# Patient Record
Sex: MALE | Race: WHITE | Employment: FULL TIME | URBAN - METROPOLITAN AREA
[De-identification: names, ages, dates, MRNs, and addresses within clinical notes are randomized per-mention and may not be internally consistent; named-entity substitution may affect disease eponyms.]

---

## 2023-07-20 ENCOUNTER — OFFICE VISIT (OUTPATIENT)
Dept: PRIMARY CARE | Facility: CLINIC | Age: 28
End: 2023-07-20
Payer: COMMERCIAL

## 2023-07-20 VITALS
BODY MASS INDEX: 26 KG/M2 | HEIGHT: 73 IN | WEIGHT: 196.2 LBS | SYSTOLIC BLOOD PRESSURE: 115 MMHG | OXYGEN SATURATION: 99 % | HEART RATE: 89 BPM | DIASTOLIC BLOOD PRESSURE: 76 MMHG | TEMPERATURE: 97.3 F

## 2023-07-20 DIAGNOSIS — M54.50 LEFT-SIDED LOW BACK PAIN WITHOUT SCIATICA, UNSPECIFIED CHRONICITY: Primary | ICD-10-CM

## 2023-07-20 DIAGNOSIS — G40.909 NONINTRACTABLE EPILEPSY WITHOUT STATUS EPILEPTICUS, UNSPECIFIED EPILEPSY TYPE (MULTI): ICD-10-CM

## 2023-07-20 PROCEDURE — 99213 OFFICE O/P EST LOW 20 MIN: CPT

## 2023-07-20 PROCEDURE — 1036F TOBACCO NON-USER: CPT

## 2023-07-20 ASSESSMENT — PAIN SCALES - GENERAL: PAINLEVEL: 2

## 2023-07-20 NOTE — PROGRESS NOTES
Subjective   Patient ID: Ryan Maldonado is a 27 y.o. male who presents for Establish Care.    HPI    #anxiety  #depression   - taryn sick to stomach, forget to breathe  - triggers->School or work presentations  - citalopram 20mg since 2015  - breathing exercises helps    #temporal lobe epilepsy  -sees neurology. Started in spring 2019.   - feelings of allie vu, with sinking feeling in stomach  - last 1 minute  - Resolved with Oxcarbazepine 300mg BID    #back sprain  - Onset chronic issue (years). 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 Denies trauma  - Located left low back  - Duration of 2 weeks   - Characterized by aching. Radiation into legs during first couple of days. Prevents movement when standing straight  - Aggravating twisting a certain way  - Relieved by prescribed flexeril 5mg as needed.     Review of Systems  As per HPI  Previous history  Past Medical History:   Diagnosis Date    Anxiety 2015    Depression 2018    Epilepsy (CMS/[facility])      Past Surgical History:   Procedure Laterality Date    WISDOM TOOTH EXTRACTION  2014     Social History     Tobacco Use    Smoking status: Never     Passive exposure: Never    Smokeless tobacco: Never   Substance Use Topics    Alcohol use: Yes     Alcohol/week: 3.0 standard drinks of alcohol     Types: 3 Cans of beer per week    Drug use: Never     Family History   Problem Relation Name Age of Onset    Colon cancer Paternal Grandfather Daniel Maldonado      No Known Allergies  Current Outpatient Medications   Medication Instructions    cetirizine (ZYRTEC) 10 mg capsule     cyclobenzaprine (FLEXERIL) 5 mg, oral, Nightly PRN       Objective     Physical Exam  HENT:      Head: Normocephalic and atraumatic.   Eyes:      General: No scleral icterus.     Conjunctiva/sclera: Conjunctivae normal.   Cardiovascular:      Rate and Rhythm: Normal rate and regular rhythm.      Heart sounds: No murmur heard.     No friction rub. No gallop.   Pulmonary:      Effort: Pulmonary effort is  normal. No respiratory distress.      Breath sounds: Normal breath sounds.   Abdominal:      General: There is no distension.      Palpations: Abdomen is soft.      Tenderness: There is no abdominal tenderness.   Musculoskeletal:         General: Tenderness present. Normal range of motion.      Comments: Thoracolumbar TTP   Skin:     General: Skin is warm and dry.   Neurological:      General: No focal deficit present.      Mental Status: He is alert and oriented to person, place, and time.   Psychiatric:         Mood and Affect: Mood normal.       Assessment/Plan   Ryan Maldonado is a 27 y.o. male who presents for the concerns below:    Problem List Items Addressed This Visit    None  Visit Diagnoses       Left-sided low back pain without sciatica, unspecified chronicity    -  Primary    Relevant Medications    cyclobenzaprine (Flexeril) 5 mg tablet    Other Relevant Orders    Referral to Physical Therapy          #anxiety  #depression   - c/w breathing exercises  - c/w Citalopram 20mg qd    #temporal lobe epilepsy  -Refer to  neurology for mgmt  - c/w oxcarbambazempine 300mg BID    #back sprain  - refer to PT  - Reorder flexeril to be used prn    Discussed with:  Dr. Mtz  Return in : 4 months    Portions of this note were generated using digital voice recognition software, and may contain grammatical errors       Joni Nazario, DO  PGY-2, Family Medicine

## 2023-07-26 RX ORDER — CYCLOBENZAPRINE HCL 5 MG
5 TABLET ORAL NIGHTLY PRN
Qty: 30 TABLET | Refills: 1 | Status: SHIPPED | OUTPATIENT
Start: 2023-07-26 | End: 2023-09-24

## 2023-07-30 NOTE — PROGRESS NOTES
I reviewed with the resident the medical history and the resident’s findings on physical examination.  I discussed with the resident the patient’s diagnosis and concur with the treatment plan as documented in the resident note.     Uzma Mtz MD

## 2024-01-17 ENCOUNTER — TELEPHONE (OUTPATIENT)
Dept: PRIMARY CARE | Facility: CLINIC | Age: 29
End: 2024-01-17
Payer: COMMERCIAL

## 2024-01-17 NOTE — TELEPHONE ENCOUNTER
Patient called and requested a callback about medication that you spoke about during his last visit and it was never prescribed. Please give the patient a call about this matter.

## 2024-02-13 DIAGNOSIS — M54.50 LEFT-SIDED LOW BACK PAIN WITHOUT SCIATICA, UNSPECIFIED CHRONICITY: Primary | ICD-10-CM

## 2024-02-13 DIAGNOSIS — M62.830 BACK SPASM: ICD-10-CM

## 2024-02-13 RX ORDER — CYCLOBENZAPRINE HCL 5 MG
5 TABLET ORAL NIGHTLY PRN
Qty: 30 TABLET | Refills: 1 | Status: SHIPPED | OUTPATIENT
Start: 2024-02-13 | End: 2024-05-13

## 2025-03-04 ENCOUNTER — HOSPITAL ENCOUNTER (EMERGENCY)
Facility: HOSPITAL | Age: 30
Discharge: HOME | End: 2025-03-05
Attending: STUDENT IN AN ORGANIZED HEALTH CARE EDUCATION/TRAINING PROGRAM
Payer: COMMERCIAL

## 2025-03-04 DIAGNOSIS — T78.40XA ALLERGIC REACTION, INITIAL ENCOUNTER: Primary | ICD-10-CM

## 2025-03-04 PROCEDURE — 96375 TX/PRO/DX INJ NEW DRUG ADDON: CPT

## 2025-03-04 PROCEDURE — 99284 EMERGENCY DEPT VISIT MOD MDM: CPT | Performed by: STUDENT IN AN ORGANIZED HEALTH CARE EDUCATION/TRAINING PROGRAM

## 2025-03-04 PROCEDURE — 96374 THER/PROPH/DIAG INJ IV PUSH: CPT

## 2025-03-04 PROCEDURE — 96372 THER/PROPH/DIAG INJ SC/IM: CPT | Performed by: EMERGENCY MEDICINE

## 2025-03-04 PROCEDURE — 99285 EMERGENCY DEPT VISIT HI MDM: CPT | Performed by: STUDENT IN AN ORGANIZED HEALTH CARE EDUCATION/TRAINING PROGRAM

## 2025-03-04 PROCEDURE — 2500000004 HC RX 250 GENERAL PHARMACY W/ HCPCS (ALT 636 FOR OP/ED): Performed by: EMERGENCY MEDICINE

## 2025-03-04 RX ORDER — FAMOTIDINE 10 MG/ML
20 INJECTION, SOLUTION INTRAVENOUS ONCE
Status: COMPLETED | OUTPATIENT
Start: 2025-03-04 | End: 2025-03-04

## 2025-03-04 RX ORDER — EPINEPHRINE CONVENIENCE KIT 1 MG/ML(1)
0.3 KIT INTRAMUSCULAR; SUBCUTANEOUS ONCE
Status: COMPLETED | OUTPATIENT
Start: 2025-03-04 | End: 2025-03-04

## 2025-03-04 RX ORDER — DIPHENHYDRAMINE HYDROCHLORIDE 50 MG/ML
50 INJECTION INTRAMUSCULAR; INTRAVENOUS ONCE
Status: COMPLETED | OUTPATIENT
Start: 2025-03-04 | End: 2025-03-04

## 2025-03-04 RX ADMIN — FAMOTIDINE 20 MG: 10 INJECTION INTRAVENOUS at 23:27

## 2025-03-04 RX ADMIN — EPINEPHRINE 0.3 MG: 1 INJECTION, SOLUTION, CONCENTRATE INTRAVENOUS at 23:16

## 2025-03-04 RX ADMIN — METHYLPREDNISOLONE SODIUM SUCCINATE 125 MG: 125 INJECTION, POWDER, FOR SOLUTION INTRAMUSCULAR; INTRAVENOUS at 23:27

## 2025-03-04 RX ADMIN — DIPHENHYDRAMINE HYDROCHLORIDE 50 MG: 50 INJECTION INTRAMUSCULAR; INTRAVENOUS at 23:27

## 2025-03-05 VITALS
HEART RATE: 72 BPM | WEIGHT: 196 LBS | RESPIRATION RATE: 13 BRPM | TEMPERATURE: 98.7 F | SYSTOLIC BLOOD PRESSURE: 127 MMHG | BODY MASS INDEX: 25.86 KG/M2 | OXYGEN SATURATION: 99 % | DIASTOLIC BLOOD PRESSURE: 81 MMHG

## 2025-03-05 RX ORDER — EPINEPHRINE 0.3 MG/.3ML
1 INJECTION SUBCUTANEOUS ONCE AS NEEDED
Qty: 1 EACH | Refills: 0 | Status: SHIPPED | OUTPATIENT
Start: 2025-03-05

## 2025-03-05 ASSESSMENT — LIFESTYLE VARIABLES
EVER FELT BAD OR GUILTY ABOUT YOUR DRINKING: NO
HAVE PEOPLE ANNOYED YOU BY CRITICIZING YOUR DRINKING: NO
HAVE YOU EVER FELT YOU SHOULD CUT DOWN ON YOUR DRINKING: NO
EVER HAD A DRINK FIRST THING IN THE MORNING TO STEADY YOUR NERVES TO GET RID OF A HANGOVER: NO
TOTAL SCORE: 0

## 2025-03-05 ASSESSMENT — PAIN DESCRIPTION - PROGRESSION: CLINICAL_PROGRESSION: NOT CHANGED

## 2025-03-05 ASSESSMENT — PAIN SCALES - GENERAL
PAINLEVEL_OUTOF10: 0 - NO PAIN
PAINLEVEL_OUTOF10: 0 - NO PAIN

## 2025-03-05 ASSESSMENT — PAIN - FUNCTIONAL ASSESSMENT: PAIN_FUNCTIONAL_ASSESSMENT: 0-10

## 2025-03-05 NOTE — PROGRESS NOTES
Asked evaluate the patient in triage due to concern for allergic reaction.  Patient states he ate [place] food at approximately 6:30 PM this evening.  He states he has a known allergy to tree nuts where he has had previous throat swelling.  States that almost immediately after eating he began having throat swelling, nausea and vomiting.  He tried taking Benadryl but threw up after taking this, took an additional dose but due to persistent symptoms came to the emergency department for evaluation.  Denied any chest pain, shortness of breath, itching, rash.  States that he feels like the back of his throat is swollen and has a muffled voice.  Denies any other allergies that he is aware of.  Due to the patient symptoms prophylactic medications including steroids, antihistamines and IM epinephrine were ordered for the patient.  As the patient is otherwise awake, alert, hemodynamically stable hold on calling the patient has a critical at this time.  Report was given to the oncoming ED team

## 2025-03-05 NOTE — ED PROVIDER NOTES
HPI   No chief complaint on file.      Patient is a 29-year-old male with reported allergies to tree nuts, seasonal allergies presenting with concern for allergic reaction.   Reports after eating pad [place] for dinner he noticed there were cashews in it which he is allergic to.  Reports copious vomiting afterwards and  later developed feeling of throat swelling  which prompted him to come to the ED.  Reports that he was initially trying symptomatic management at home.  Denies any rash, hives, shortness of breath, chest pain or other symptoms.  Does report having EpiPen but thinks it  in 2018 and did not attempt use at home.  Reports no other medications or medical history.            Patient History   Past Medical History:   Diagnosis Date    Anxiety 2015    Depression 2018    Epilepsy ([facility])      Past Surgical History:   Procedure Laterality Date    WISDOM TOOTH EXTRACTION  2014     Family History   Problem Relation Name Age of Onset    Colon cancer Paternal Grandfather Daniel Maldonado      Social History     Tobacco Use    Smoking status: Never     Passive exposure: Never    Smokeless tobacco: Never   Substance Use Topics    Alcohol use: Yes     Alcohol/week: 3.0 standard drinks of alcohol     Types: 3 Cans of beer per week    Drug use: Never       Physical Exam   ED Triage Vitals [25 2300]   Temperature Heart Rate Respirations BP   37.1 °C (98.7 °F) 85 18 (!) 166/110      Pulse Ox Temp Source Heart Rate Source Patient Position   99 % Temporal Monitor --      BP Location FiO2 (%)     -- --       Physical Exam  Constitutional:       Appearance: Normal appearance.   HENT:      Head: Normocephalic and atraumatic.      Mouth/Throat:      Comments: Uvula midline, mildly swollen, no tonsillar swelling or exudate.  Eyes:      Extraocular Movements: Extraocular movements intact.   Cardiovascular:      Rate and Rhythm: Normal rate.   Pulmonary:      Effort: Pulmonary effort is normal.      Comments: Clear to  auscultation bilaterally, no stridor.  Musculoskeletal:         General: Normal range of motion.      Cervical back: Normal range of motion.   Skin:     General: Skin is warm and dry.   Neurological:      General: No focal deficit present.      Mental Status: He is alert and oriented to person, place, and time.   Psychiatric:         Mood and Affect: Mood normal.         Behavior: Behavior normal.           ED Course & [facility]   ED Course as of 25   Tue Mar 04, 2025   2331 29-year-old male with history of tree nut allergy presenting emergency department with throat swelling, ordered reaction after eating [place] food.  Took Benadryl at home, continued symptoms so he came in today.  Patient was given epinephrine empirically for potential anaphylaxis given his mild angioedema and throat swelling with voice changes.  He is tolerating his oral secretions, not hypoxic, hemodynamically stable, no other clinical manifestations of anaphylaxis.  Has mild uvular edema.  Will monitor for symptomatic resolution and then a few hours after that.  Patient has  EpiPen with him, will update this and give him a refill. [SH]   Wed Mar 05, 2025   0221 Patient reassessed, feeling much better, reports symptoms have all improved and resolved.  Still has very mild uvular edema.  Discussed that patient is likely appropriate for discharge at this time, discussed return precautions, discussed keeping EpiPen's with him at all times.  Will discharge home, patient agreeable to this plan of care. [SH]      ED Course User Index  [SH] Sancho Alan MD         Diagnoses as of 25   Allergic reaction, initial encounter                 No data recorded     [place] Coma Scale Score: 15 (25 [id number] : Arlene Monet RN)                           Medical Decision Making  Patient is a 29-year-old male with reported allergies to tree nuts, seasonal allergies presenting with concern for allergic reaction.  Nausea vomiting and  feeling of throat swelling after exposure to known allergy consistent with allergic response.  Treated for anaphylaxis with epinephrine as well as diphenhydramine, famotidine, methylprednisolone.  Exam generally reassuring other than mild uvula swelling.  Observed for 4 hours in ED with no evidence of recurrence of symptoms.  Uvula swelling improving on reexamination.  Advised on return precautions for biphasic reaction, prescribed EpiPen for home and discharged home.    Patient seen and discussed with Dr. Dayanna Pride MD, PhD  Emergency Medicine PGY3          Procedure  Procedures     Otoniel Pride MD  Resident  03/05/25 0921

## 2025-03-05 NOTE — ED PROCEDURE NOTE
Procedure  Critical Care    Performed by: Sancho Alan MD  Authorized by: Sancho Alan MD    Critical care provider statement:     Critical care time (minutes):  10    Critical care time was exclusive of:  Teaching time and separately billable procedures and treating other patients    Critical care was necessary to treat or prevent imminent or life-threatening deterioration of the following conditions: Anaphylaxis.    Critical care was time spent personally by me on the following activities:  Development of treatment plan with patient or surrogate, evaluation of patient's response to treatment, examination of patient, obtaining history from patient or surrogate, ordering and performing treatments and interventions, pulse oximetry and re-evaluation of patient's condition               Sancho Alan MD  03/05/25 9495